# Patient Record
(demographics unavailable — no encounter records)

---

## 2024-10-09 NOTE — DISCUSSION/SUMMARY
[de-identified] : reviewed the case and the imaging with the patient  lumbar pain and radiculopathy versus right hip  discussion of the condition and treatment options cautions discussed questions answered discussion of natural history of the condition and what the next step would be PT MRI L spine and R hip - he can call and set to set this up

## 2024-10-09 NOTE — HISTORY OF PRESENT ILLNESS
[5] : 5 [] : yes [Constant] : constant [Sleep] : sleep [Rest] : rest [Standing] : standing [Walking] : walking [de-identified] : 10.9.24:  68 yo M - New patient here for lower back pain. Patient states pain started on 10.1.24, no injury. Was moose hunting in [place] - was in a rough vehicle - starts in the right lower back and around the hip down the front of the thigh - left sided is okay - no prior epsidoes  Tried voltaren  Tried iburprofen  No PT /accupuncture/pain managment No prior surgery or injections  Patient states pcp ordered x-rays. and was given muscle relaxer and steroids - helped a bit   xray from [facility] 10/4//24 -  ap pelvis and l spine - Bilateral cam morphology of the femoral head neck junctions. Moderate bilateral hip osteoarthrosis with joint space narrowing, subchondral osseous changes and marginal osteophyte formation. 5 lumbar-type vertebrae are noted. Mild thoracolumbar levoscoliosis. Normal lordosis. Grade 1 retrolisthesis L1 on L2. No significant translation on flexion or extension views. Vertebral body heights are maintained. No aggressive osseous lesion. Moderate to severe loss of disc height and associated degenerative change at L5-S1, otherwise mild ranging to moderate diffuse.. 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